# Patient Record
Sex: MALE | Race: WHITE | NOT HISPANIC OR LATINO | Employment: FULL TIME | URBAN - NONMETROPOLITAN AREA
[De-identification: names, ages, dates, MRNs, and addresses within clinical notes are randomized per-mention and may not be internally consistent; named-entity substitution may affect disease eponyms.]

---

## 2017-11-14 ENCOUNTER — APPOINTMENT (OUTPATIENT)
Dept: LAB | Facility: HOSPITAL | Age: 57
End: 2017-11-14

## 2017-11-14 ENCOUNTER — TRANSCRIBE ORDERS (OUTPATIENT)
Dept: ADMINISTRATIVE | Facility: HOSPITAL | Age: 57
End: 2017-11-14

## 2017-11-14 ENCOUNTER — HOSPITAL ENCOUNTER (OUTPATIENT)
Dept: GENERAL RADIOLOGY | Facility: HOSPITAL | Age: 57
Discharge: HOME | End: 2017-11-14
Admitting: UROLOGY

## 2017-11-14 DIAGNOSIS — N13.8 ENLARGED PROSTATE WITH URINARY OBSTRUCTION: Primary | ICD-10-CM

## 2017-11-14 DIAGNOSIS — N40.1 ENLARGED PROSTATE WITH URINARY OBSTRUCTION: Primary | ICD-10-CM

## 2017-11-14 LAB
ALBUMIN SERPL-MCNC: 4.5 G/DL (ref 3.5–5)
ALBUMIN/GLOB SERPL: 1.5 G/DL (ref 1–2)
ALP SERPL-CCNC: 94 U/L (ref 38–126)
ALT SERPL W P-5'-P-CCNC: 37 U/L (ref 13–69)
ANION GAP SERPL CALCULATED.3IONS-SCNC: 17.8 MMOL/L
AST SERPL-CCNC: 21 U/L (ref 15–46)
BILIRUB SERPL-MCNC: 1 MG/DL (ref 0.2–1.3)
BUN BLD-MCNC: 21 MG/DL (ref 7–20)
BUN/CREAT SERPL: 17.5 (ref 6.3–21.9)
CALCIUM SPEC-SCNC: 9.8 MG/DL (ref 8.4–10.2)
CHLORIDE SERPL-SCNC: 106 MMOL/L (ref 98–107)
CO2 SERPL-SCNC: 24 MMOL/L (ref 26–30)
CREAT BLD-MCNC: 1.2 MG/DL (ref 0.6–1.3)
GFR SERPL CREATININE-BSD FRML MDRD: 62 ML/MIN/1.73
GLOBULIN UR ELPH-MCNC: 3 GM/DL
GLUCOSE BLD-MCNC: 115 MG/DL (ref 74–98)
POTASSIUM BLD-SCNC: 4.8 MMOL/L (ref 3.5–5.1)
PROT SERPL-MCNC: 7.5 G/DL (ref 6.3–8.2)
PSA SERPL-MCNC: 1.32 NG/ML (ref 0.06–4)
SODIUM BLD-SCNC: 143 MMOL/L (ref 137–145)

## 2017-11-14 PROCEDURE — 36415 COLL VENOUS BLD VENIPUNCTURE: CPT | Performed by: UROLOGY

## 2017-11-14 PROCEDURE — 80053 COMPREHEN METABOLIC PANEL: CPT | Performed by: UROLOGY

## 2017-11-14 PROCEDURE — 84153 ASSAY OF PSA TOTAL: CPT | Performed by: UROLOGY

## 2017-11-14 PROCEDURE — 74000 HC ABDOMEN KUB: CPT

## 2021-07-30 ENCOUNTER — OFFICE VISIT (OUTPATIENT)
Dept: CARDIOLOGY | Facility: CLINIC | Age: 61
End: 2021-07-30

## 2021-07-30 VITALS
SYSTOLIC BLOOD PRESSURE: 152 MMHG | HEART RATE: 89 BPM | WEIGHT: 293 LBS | DIASTOLIC BLOOD PRESSURE: 100 MMHG | BODY MASS INDEX: 41.95 KG/M2 | HEIGHT: 70 IN | OXYGEN SATURATION: 97 %

## 2021-07-30 DIAGNOSIS — I10 ESSENTIAL HYPERTENSION: ICD-10-CM

## 2021-07-30 DIAGNOSIS — E78.5 DYSLIPIDEMIA: ICD-10-CM

## 2021-07-30 DIAGNOSIS — E66.01 MORBID OBESITY (HCC): ICD-10-CM

## 2021-07-30 DIAGNOSIS — Z72.0 TOBACCO USE: ICD-10-CM

## 2021-07-30 DIAGNOSIS — R07.2 PRECORDIAL PAIN: Primary | ICD-10-CM

## 2021-07-30 PROCEDURE — 99243 OFF/OP CNSLTJ NEW/EST LOW 30: CPT | Performed by: INTERNAL MEDICINE

## 2021-07-30 RX ORDER — LOSARTAN POTASSIUM 25 MG/1
25 TABLET ORAL DAILY
COMMUNITY
Start: 2021-07-07

## 2021-07-30 RX ORDER — CITALOPRAM 40 MG/1
40 TABLET ORAL DAILY
COMMUNITY
Start: 2021-07-07

## 2021-07-30 RX ORDER — ATORVASTATIN CALCIUM 40 MG/1
40 TABLET, FILM COATED ORAL DAILY
COMMUNITY
Start: 2021-07-07

## 2021-07-30 RX ORDER — LEVOTHYROXINE SODIUM 88 UG/1
88 TABLET ORAL DAILY
COMMUNITY
Start: 2021-07-07

## 2021-07-30 RX ORDER — METFORMIN HYDROCHLORIDE 500 MG/1
TABLET, EXTENDED RELEASE ORAL
COMMUNITY
Start: 2021-07-07

## 2021-07-30 RX ORDER — BACLOFEN 20 MG/1
20 TABLET ORAL 2 TIMES DAILY PRN
COMMUNITY
Start: 2021-07-07

## 2021-07-30 RX ORDER — HYDROCODONE BITARTRATE AND ACETAMINOPHEN 10; 325 MG/1; MG/1
1 TABLET ORAL EVERY 8 HOURS PRN
COMMUNITY
Start: 2021-07-07 | End: 2021-09-13

## 2021-07-30 NOTE — PROGRESS NOTES
"    Subjective:     Encounter Date:07/30/2021      Patient ID: Matthew Gibbs is a 60 y.o. male.    Chief Complaint: Chest pain  HPI  This is a 60-year-old male patient who reports a 10-year history of intermittent chest discomfort.  The patient describes a sharp anterior precordial discomfort that occasionally radiates into the inner aspect of his left arm.  The discomfort occurs anywhere from once a month to once every 2 to 3 months.  The discomfort typically has a 5/10 intensity and will last anywhere from 1 minute to 30 minutes.  There is no associated symptoms.  He cannot identify any precipitating aggravating or alleviating features.  There is no exertional or pleuritic component.  He has no shortness of breath at rest or with activity.  He has a history of hypertension type 2 diabetes mellitus and dyslipidemia.  He does not smoke but \"chews tobacco on occasion\".  He has no prior history of myocardial infarction or coronary revascularization.  He reports having a stress test over 30 years ago which was negative.  He denies shortness of breath at rest or with activity.  There is no orthopnea PND or lower extremity edema.  He has no dizziness palpitations or syncope.  The patient indicates that he is unable to do treadmill exercise stress testing due to severe back pain.  He has had multiple prior back surgeries and is on narcotic therapy daily.  He has now apparently developed spinal stenosis due to scar tissue formation and has had progressive weakness in both lower extremities.  The following portions of the patient's history were reviewed and updated as appropriate: allergies, current medications, past family history, past medical history, past social history, past surgical history and problem  Review of Systems   Constitutional: Negative for chills, diaphoresis, fever, malaise/fatigue, weight gain and weight loss.   HENT: Negative for ear discharge, hearing loss, hoarse voice and nosebleeds.    Eyes: Negative " for discharge, double vision, pain and photophobia.   Cardiovascular: Positive for chest pain. Negative for claudication, cyanosis, dyspnea on exertion, irregular heartbeat, leg swelling, near-syncope, orthopnea, palpitations, paroxysmal nocturnal dyspnea and syncope.   Respiratory: Negative for cough, hemoptysis, shortness of breath, sputum production and wheezing.    Endocrine: Negative for cold intolerance, heat intolerance, polydipsia, polyphagia and polyuria.   Hematologic/Lymphatic: Negative for adenopathy and bleeding problem. Does not bruise/bleed easily.   Skin: Negative for color change, flushing, itching and rash.   Musculoskeletal: Negative for muscle cramps, muscle weakness, myalgias and stiffness.   Gastrointestinal: Negative for abdominal pain, diarrhea, hematemesis, hematochezia, nausea and vomiting.   Genitourinary: Negative for dysuria, frequency and nocturia.   Neurological: Negative for focal weakness, loss of balance, numbness, paresthesias and seizures.   Psychiatric/Behavioral: Negative for altered mental status, hallucinations and suicidal ideas.   Allergic/Immunologic: Negative for HIV exposure, hives and persistent infections.           Current Outpatient Medications:   •  atorvastatin (LIPITOR) 40 MG tablet, Take 40 mg by mouth Daily., Disp: , Rfl:   •  baclofen (LIORESAL) 20 MG tablet, Take 20 mg by mouth 2 (Two) Times a Day As Needed., Disp: , Rfl:   •  citalopram (CeleXA) 40 MG tablet, Take 40 mg by mouth Daily., Disp: , Rfl:   •  HYDROcodone-acetaminophen (NORCO)  MG per tablet, Take 1 tablet by mouth Every 8 (Eight) Hours As Needed. for pain, Disp: , Rfl:   •  levothyroxine (SYNTHROID, LEVOTHROID) 88 MCG tablet, Take 88 mcg by mouth Daily., Disp: , Rfl:   •  losartan (COZAAR) 25 MG tablet, Take 25 mg by mouth Daily. for blood pressure., Disp: , Rfl:   •  metFORMIN ER (GLUCOPHAGE-XR) 500 MG 24 hr tablet, TAKE 1 TABLET BY MOUTH EVERY DAY FOR SUGAR, Disp: , Rfl:     Objective:  "  Vitals and nursing note reviewed.   Constitutional:       Appearance: Healthy appearance. Not in distress.   Neck:      Vascular: No JVR. JVD normal.   Pulmonary:      Effort: Pulmonary effort is normal.      Breath sounds: Normal breath sounds. No wheezing. No rhonchi. No rales.   Chest:      Chest wall: Not tender to palpatation.   Cardiovascular:      PMI at left midclavicular line. Normal rate. Regular rhythm. Normal S1. Normal S2.      Murmurs: There is no murmur.      No gallop. No click. No rub.   Pulses:     Intact distal pulses.   Edema:     Peripheral edema absent.   Abdominal:      General: Bowel sounds are normal.      Palpations: Abdomen is soft.      Tenderness: There is no abdominal tenderness.   Musculoskeletal: Normal range of motion.         General: No tenderness. Skin:     General: Skin is warm and dry.   Neurological:      General: No focal deficit present.      Mental Status: Alert and oriented to person, place and time.       Blood pressure 152/100, pulse 89, height 177.8 cm (70\"), weight 133 kg (293 lb), SpO2 97 %.   Lab Review:     Assessment:       1. Precordial pain  Atypical chest pain.  Multiple risk factors for coronary artery disease.  No ischemic evaluation in the last 30 years.  The patient is unable to do treadmill exercise stress testing due to back pain and spinal stenosis resulting in bilateral leg weakness.    2. Essential hypertension  The patient feels that his blood pressure is elevated today due to ongoing back pain.  He indicates that his home blood pressures are much better controlled when he is not in as much discomfort.    3. Dyslipidemia  This is followed by his primary care provider.    4. Morbid obesity (CMS/[facility])  Body mass index is greater than 42.  The obesity pattern is central.  There is evidence of comorbidities.  This is due to excess calorie intake.    5. Tobacco use  Smokeless tobacco use.    Procedures    Plan:     The patient has been counseled regarding " the need to eliminate all forms of tobacco use.  I have recommended a vasodilator nuclear stress test utilizing a 2-day imaging protocol with supine and prone stress images in order to help mitigate for attenuation artifact which is anticipated given his body habitus.  Further recommendations will be predicated on the results of his outpatient testing.

## 2021-08-12 ENCOUNTER — HOSPITAL ENCOUNTER (OUTPATIENT)
Dept: NUCLEAR MEDICINE | Facility: HOSPITAL | Age: 61
Discharge: HOME | End: 2021-08-12

## 2021-08-12 PROCEDURE — 93017 CV STRESS TEST TRACING ONLY: CPT

## 2021-08-12 PROCEDURE — 78452 HT MUSCLE IMAGE SPECT MULT: CPT

## 2021-08-12 PROCEDURE — 25010000002 REGADENOSON 0.4 MG/5ML SOLUTION: Performed by: INTERNAL MEDICINE

## 2021-08-12 PROCEDURE — 93018 CV STRESS TEST I&R ONLY: CPT | Performed by: INTERNAL MEDICINE

## 2021-08-12 PROCEDURE — 0 TECHNETIUM SESTAMIBI: Performed by: INTERNAL MEDICINE

## 2021-08-12 PROCEDURE — A9500 TC99M SESTAMIBI: HCPCS | Performed by: INTERNAL MEDICINE

## 2021-08-12 PROCEDURE — 78452 HT MUSCLE IMAGE SPECT MULT: CPT | Performed by: INTERNAL MEDICINE

## 2021-08-12 RX ADMIN — TECHNETIUM TC 99M SESTAMIBI 1 DOSE: 1 INJECTION INTRAVENOUS at 08:52

## 2021-08-12 RX ADMIN — REGADENOSON 0.4 MG: 0.08 INJECTION, SOLUTION INTRAVENOUS at 08:52

## 2021-08-13 ENCOUNTER — HOSPITAL ENCOUNTER (OUTPATIENT)
Dept: NUCLEAR MEDICINE | Facility: HOSPITAL | Age: 61
Discharge: HOME | End: 2021-08-13

## 2021-08-13 PROCEDURE — A9500 TC99M SESTAMIBI: HCPCS | Performed by: INTERNAL MEDICINE

## 2021-08-13 PROCEDURE — 0 TECHNETIUM SESTAMIBI: Performed by: INTERNAL MEDICINE

## 2021-08-13 RX ADMIN — TECHNETIUM TC 99M SESTAMIBI 1 DOSE: 1 INJECTION INTRAVENOUS at 08:50

## 2021-08-16 LAB
BH CV REST NUCLEAR ISOTOPE DOSE: 31.2 MCI
BH CV STRESS COMMENTS STAGE 1: NORMAL
BH CV STRESS DOSE REGADENOSON STAGE 1: 0.4
BH CV STRESS DURATION MIN STAGE 1: 0
BH CV STRESS DURATION SEC STAGE 1: 10
BH CV STRESS NUCLEAR ISOTOPE DOSE: 32.8 MCI
BH CV STRESS PROTOCOL 1: NORMAL
BH CV STRESS RECOVERY BP: NORMAL MMHG
BH CV STRESS RECOVERY HR: 85 BPM
BH CV STRESS STAGE 1: 1
LV EF NUC BP: 65 %
MAXIMAL PREDICTED HEART RATE: 160 BPM
PERCENT MAX PREDICTED HR: 56.88 %
STRESS BASELINE BP: NORMAL MMHG
STRESS BASELINE HR: 78 BPM
STRESS PERCENT HR: 67 %
STRESS POST PEAK BP: NORMAL MMHG
STRESS POST PEAK HR: 91 BPM
STRESS TARGET HR: 136 BPM

## 2021-08-20 ENCOUNTER — TRANSCRIBE ORDERS (OUTPATIENT)
Dept: ADMINISTRATIVE | Facility: HOSPITAL | Age: 61
End: 2021-08-20

## 2021-08-20 DIAGNOSIS — M54.50 LOW BACK PAIN, UNSPECIFIED BACK PAIN LATERALITY, UNSPECIFIED CHRONICITY, UNSPECIFIED WHETHER SCIATICA PRESENT: Primary | ICD-10-CM

## 2021-08-20 DIAGNOSIS — M54.16 LUMBAR RADICULOPATHY: ICD-10-CM

## 2021-09-13 ENCOUNTER — OFFICE VISIT (OUTPATIENT)
Dept: CARDIOLOGY | Facility: CLINIC | Age: 61
End: 2021-09-13

## 2021-09-13 VITALS
DIASTOLIC BLOOD PRESSURE: 80 MMHG | HEIGHT: 70 IN | BODY MASS INDEX: 42.52 KG/M2 | SYSTOLIC BLOOD PRESSURE: 148 MMHG | OXYGEN SATURATION: 95 % | HEART RATE: 91 BPM | WEIGHT: 297 LBS

## 2021-09-13 DIAGNOSIS — E78.5 DYSLIPIDEMIA: Primary | ICD-10-CM

## 2021-09-13 DIAGNOSIS — Z01.810 PRE-OPERATIVE CARDIOVASCULAR EXAMINATION: ICD-10-CM

## 2021-09-13 DIAGNOSIS — I10 ESSENTIAL HYPERTENSION: ICD-10-CM

## 2021-09-13 DIAGNOSIS — R07.2 PRECORDIAL PAIN: ICD-10-CM

## 2021-09-13 PROCEDURE — 99213 OFFICE O/P EST LOW 20 MIN: CPT | Performed by: INTERNAL MEDICINE

## 2021-09-13 RX ORDER — OXYCODONE AND ACETAMINOPHEN 7.5; 325 MG/1; MG/1
1 TABLET ORAL EVERY 8 HOURS
COMMUNITY
Start: 2021-09-10

## 2021-09-13 RX ORDER — PREGABALIN 75 MG/1
75 CAPSULE ORAL 2 TIMES DAILY
COMMUNITY
Start: 2021-09-09

## 2021-09-13 NOTE — PROGRESS NOTES
Subjective:     Encounter Date:09/13/2021      Patient ID: Matthew Gibbs is a 60 y.o. male.    Chief Complaint: Chest pain  HPI  This is a 60-year-old male patient who underwent an outpatient nuclear stress test to evaluate atypical chest pain.  Vasodilator nuclear stress testing showed no evidence of ischemia or infarction with a normal calculated ejection fraction.  The patient indicates his chest discomfort has spontaneously resolved.  He indicates that his primary issue is low back pain.  He is planning to have an MRI of his spine later this week and possible repeat back surgery.  He remains a non-smoker.  The following portions of the patient's history were reviewed and updated as appropriate: allergies, current medications, past family history, past medical history, past social history, past surgical history and problem  Review of Systems   Constitutional: Negative for chills, diaphoresis, fever, malaise/fatigue, weight gain and weight loss.   HENT: Negative for ear discharge, hearing loss, hoarse voice and nosebleeds.    Eyes: Negative for discharge, double vision, pain and photophobia.   Cardiovascular: Negative for chest pain, claudication, cyanosis, dyspnea on exertion, irregular heartbeat, leg swelling, near-syncope, orthopnea, palpitations, paroxysmal nocturnal dyspnea and syncope.   Respiratory: Negative for cough, hemoptysis, shortness of breath, sputum production and wheezing.    Endocrine: Negative for cold intolerance, heat intolerance, polydipsia, polyphagia and polyuria.   Hematologic/Lymphatic: Negative for adenopathy and bleeding problem. Does not bruise/bleed easily.   Skin: Negative for color change, flushing, itching and rash.   Musculoskeletal: Negative for muscle cramps, muscle weakness, myalgias and stiffness.   Gastrointestinal: Negative for abdominal pain, diarrhea, hematemesis, hematochezia, nausea and vomiting.   Genitourinary: Negative for dysuria, frequency and nocturia.    Neurological: Negative for focal weakness, loss of balance, numbness, paresthesias and seizures.   Psychiatric/Behavioral: Negative for altered mental status, hallucinations and suicidal ideas.   Allergic/Immunologic: Negative for HIV exposure, hives and persistent infections.           Current Outpatient Medications:   •  atorvastatin (LIPITOR) 40 MG tablet, Take 40 mg by mouth Daily., Disp: , Rfl:   •  baclofen (LIORESAL) 20 MG tablet, Take 20 mg by mouth 2 (Two) Times a Day As Needed., Disp: , Rfl:   •  citalopram (CeleXA) 40 MG tablet, Take 40 mg by mouth Daily., Disp: , Rfl:   •  levothyroxine (SYNTHROID, LEVOTHROID) 88 MCG tablet, Take 88 mcg by mouth Daily., Disp: , Rfl:   •  losartan (COZAAR) 25 MG tablet, Take 25 mg by mouth Daily. for blood pressure., Disp: , Rfl:   •  metFORMIN ER (GLUCOPHAGE-XR) 500 MG 24 hr tablet, TAKE 1 TABLET BY MOUTH EVERY DAY FOR SUGAR, Disp: , Rfl:   •  oxyCODONE-acetaminophen (PERCOCET) 7.5-325 MG per tablet, Take 1 tablet by mouth Every 8 (Eight) Hours., Disp: , Rfl:   •  pregabalin (LYRICA) 75 MG capsule, Take 75 mg by mouth 2 (Two) Times a Day., Disp: , Rfl:     Objective:   Vitals and nursing note reviewed.   Constitutional:       Appearance: Healthy appearance. Not in distress.   Neck:      Vascular: No JVR. JVD normal.   Pulmonary:      Effort: Pulmonary effort is normal.      Breath sounds: Normal breath sounds. No wheezing. No rhonchi. No rales.   Chest:      Chest wall: Not tender to palpatation.   Cardiovascular:      PMI at left midclavicular line. Normal rate. Regular rhythm. Normal S1. Normal S2.      Murmurs: There is no murmur.      No gallop. No click. No rub.   Pulses:     Intact distal pulses.   Edema:     Peripheral edema absent.   Abdominal:      General: Bowel sounds are normal.      Palpations: Abdomen is soft.      Tenderness: There is no abdominal tenderness.   Musculoskeletal: Normal range of motion.         General: No tenderness. Skin:     General:  "Skin is warm and dry.   Neurological:      General: No focal deficit present.      Mental Status: Alert and oriented to person, place and time.       Blood pressure 148/80, pulse 91, height 177.8 cm (70\"), weight 135 kg (297 lb), SpO2 95 %.   Lab Review:     Assessment:       1. Dyslipidemia  Managed per primary care provider.    2. Essential hypertension  Less than ideal blood pressure control.  It appears his blood pressure is being driven upward by chronic pain.  He is no longer taking Lortabs.  Management is deferred to his primary care provider.    3. Precordial pain  Noncardiac chest pain.  Spontaneous resolution of symptoms.  No evidence of ischemic heart disease.    4. Pre-operative cardiovascular examination  The patient is considered acceptable risk of a cardiovascular complication if he proceeds with back surgery.    Procedures    Plan:     If the patient's blood pressure remains elevated, I would focus on better pain control measures.  If this does not improve blood pressure control I would recommend up titration of his losartan with the possible addition of thiazide type diuretics.  The importance of diet exercise and weight management has been reinforced to the patient.  The patient has been counseled regarding the importance of dietary sodium restriction.  The patient may proceed with back surgery if deemed necessary at an acceptable risk of cardiovascular complication without further delay, additional testing or change in medications.      "

## 2021-09-17 ENCOUNTER — HOSPITAL ENCOUNTER (OUTPATIENT)
Dept: MRI IMAGING | Facility: HOSPITAL | Age: 61
Discharge: HOME | End: 2021-09-17
Admitting: PHYSICIAN ASSISTANT

## 2021-09-17 DIAGNOSIS — M54.16 LUMBAR RADICULOPATHY: ICD-10-CM

## 2021-09-17 DIAGNOSIS — M54.50 LOW BACK PAIN, UNSPECIFIED BACK PAIN LATERALITY, UNSPECIFIED CHRONICITY, UNSPECIFIED WHETHER SCIATICA PRESENT: ICD-10-CM

## 2021-09-17 PROCEDURE — 0 GADOBENATE DIMEGLUMINE 529 MG/ML SOLUTION: Performed by: PHYSICIAN ASSISTANT

## 2021-09-17 PROCEDURE — 72158 MRI LUMBAR SPINE W/O & W/DYE: CPT

## 2021-09-17 PROCEDURE — A9577 INJ MULTIHANCE: HCPCS | Performed by: PHYSICIAN ASSISTANT

## 2021-09-17 RX ADMIN — GADOBENATE DIMEGLUMINE 12 ML: 529 INJECTION, SOLUTION INTRAVENOUS at 19:18

## 2021-09-17 RX ADMIN — GADOBENATE DIMEGLUMINE 15 ML: 529 INJECTION, SOLUTION INTRAVENOUS at 19:18

## 2023-07-28 ENCOUNTER — APPOINTMENT (OUTPATIENT)
Dept: CT IMAGING | Facility: HOSPITAL | Age: 63
End: 2023-07-28
Payer: COMMERCIAL

## 2023-07-28 ENCOUNTER — APPOINTMENT (OUTPATIENT)
Dept: GENERAL RADIOLOGY | Facility: HOSPITAL | Age: 63
End: 2023-07-28
Payer: COMMERCIAL

## 2023-07-28 ENCOUNTER — HOSPITAL ENCOUNTER (EMERGENCY)
Facility: HOSPITAL | Age: 63
Discharge: HOME | End: 2023-07-28
Attending: STUDENT IN AN ORGANIZED HEALTH CARE EDUCATION/TRAINING PROGRAM
Payer: COMMERCIAL

## 2023-07-28 VITALS
SYSTOLIC BLOOD PRESSURE: 149 MMHG | BODY MASS INDEX: 40.37 KG/M2 | DIASTOLIC BLOOD PRESSURE: 72 MMHG | HEIGHT: 70 IN | WEIGHT: 282 LBS | HEART RATE: 69 BPM | OXYGEN SATURATION: 97 % | TEMPERATURE: 98.1 F | RESPIRATION RATE: 20 BRPM

## 2023-07-28 DIAGNOSIS — R06.00 DYSPNEA, UNSPECIFIED TYPE: ICD-10-CM

## 2023-07-28 DIAGNOSIS — R07.9 CHEST PAIN, UNSPECIFIED TYPE: Primary | ICD-10-CM

## 2023-07-28 LAB
ALBUMIN SERPL-MCNC: 4.3 G/DL (ref 3.5–5.2)
ALBUMIN/GLOB SERPL: 1.2 G/DL
ALP SERPL-CCNC: 99 U/L (ref 39–117)
ALT SERPL W P-5'-P-CCNC: 16 U/L (ref 1–41)
ANION GAP SERPL CALCULATED.3IONS-SCNC: 12.9 MMOL/L (ref 5–15)
AST SERPL-CCNC: 14 U/L (ref 1–40)
BASOPHILS # BLD AUTO: 0.06 10*3/MM3 (ref 0–0.2)
BASOPHILS NFR BLD AUTO: 0.4 % (ref 0–1.5)
BILIRUB SERPL-MCNC: 1.2 MG/DL (ref 0–1.2)
BUN SERPL-MCNC: 19 MG/DL (ref 8–23)
BUN/CREAT SERPL: 17.6 (ref 7–25)
CALCIUM SPEC-SCNC: 10.1 MG/DL (ref 8.6–10.5)
CHLORIDE SERPL-SCNC: 101 MMOL/L (ref 98–107)
CO2 SERPL-SCNC: 23.1 MMOL/L (ref 22–29)
CREAT SERPL-MCNC: 1.08 MG/DL (ref 0.76–1.27)
DEPRECATED RDW RBC AUTO: 43.5 FL (ref 37–54)
EGFRCR SERPLBLD CKD-EPI 2021: 77.6 ML/MIN/1.73
EOSINOPHIL # BLD AUTO: 0.2 10*3/MM3 (ref 0–0.4)
EOSINOPHIL NFR BLD AUTO: 1.4 % (ref 0.3–6.2)
ERYTHROCYTE [DISTWIDTH] IN BLOOD BY AUTOMATED COUNT: 14.3 % (ref 12.3–15.4)
FLUAV RNA RESP QL NAA+PROBE: NOT DETECTED
FLUBV RNA RESP QL NAA+PROBE: NOT DETECTED
GEN 5 2HR TROPONIN T REFLEX: 10 NG/L
GLOBULIN UR ELPH-MCNC: 3.5 GM/DL
GLUCOSE SERPL-MCNC: 147 MG/DL (ref 65–99)
HCT VFR BLD AUTO: 43.2 % (ref 37.5–51)
HGB BLD-MCNC: 14.5 G/DL (ref 13–17.7)
HOLD SPECIMEN: NORMAL
HOLD SPECIMEN: NORMAL
IMM GRANULOCYTES # BLD AUTO: 0.07 10*3/MM3 (ref 0–0.05)
IMM GRANULOCYTES NFR BLD AUTO: 0.5 % (ref 0–0.5)
LYMPHOCYTES # BLD AUTO: 1.92 10*3/MM3 (ref 0.7–3.1)
LYMPHOCYTES NFR BLD AUTO: 13.4 % (ref 19.6–45.3)
MCH RBC QN AUTO: 28.2 PG (ref 26.6–33)
MCHC RBC AUTO-ENTMCNC: 33.6 G/DL (ref 31.5–35.7)
MCV RBC AUTO: 84 FL (ref 79–97)
MONOCYTES # BLD AUTO: 1.42 10*3/MM3 (ref 0.1–0.9)
MONOCYTES NFR BLD AUTO: 9.9 % (ref 5–12)
NEUTROPHILS NFR BLD AUTO: 10.62 10*3/MM3 (ref 1.7–7)
NEUTROPHILS NFR BLD AUTO: 74.4 % (ref 42.7–76)
NRBC BLD AUTO-RTO: 0 /100 WBC (ref 0–0.2)
NT-PROBNP SERPL-MCNC: 361.7 PG/ML (ref 0–900)
PLATELET # BLD AUTO: 244 10*3/MM3 (ref 140–450)
PMV BLD AUTO: 8.3 FL (ref 6–12)
POTASSIUM SERPL-SCNC: 4.7 MMOL/L (ref 3.5–5.2)
PROCALCITONIN SERPL-MCNC: 0.06 NG/ML (ref 0–0.25)
PROT SERPL-MCNC: 7.8 G/DL (ref 6–8.5)
RBC # BLD AUTO: 5.14 10*6/MM3 (ref 4.14–5.8)
SARS-COV-2 RNA RESP QL NAA+PROBE: NOT DETECTED
SODIUM SERPL-SCNC: 137 MMOL/L (ref 136–145)
TROPONIN T DELTA: 0 NG/L
TROPONIN T SERPL HS-MCNC: 10 NG/L
WBC NRBC COR # BLD: 14.29 10*3/MM3 (ref 3.4–10.8)
WHOLE BLOOD HOLD COAG: NORMAL
WHOLE BLOOD HOLD SPECIMEN: NORMAL

## 2023-07-28 PROCEDURE — 71275 CT ANGIOGRAPHY CHEST: CPT

## 2023-07-28 PROCEDURE — 83880 ASSAY OF NATRIURETIC PEPTIDE: CPT | Performed by: PHYSICIAN ASSISTANT

## 2023-07-28 PROCEDURE — 93005 ELECTROCARDIOGRAM TRACING: CPT | Performed by: STUDENT IN AN ORGANIZED HEALTH CARE EDUCATION/TRAINING PROGRAM

## 2023-07-28 PROCEDURE — 84145 PROCALCITONIN (PCT): CPT | Performed by: PHYSICIAN ASSISTANT

## 2023-07-28 PROCEDURE — 87636 SARSCOV2 & INF A&B AMP PRB: CPT | Performed by: PHYSICIAN ASSISTANT

## 2023-07-28 PROCEDURE — 99285 EMERGENCY DEPT VISIT HI MDM: CPT

## 2023-07-28 PROCEDURE — 25510000001 IOPAMIDOL 61 % SOLUTION: Performed by: STUDENT IN AN ORGANIZED HEALTH CARE EDUCATION/TRAINING PROGRAM

## 2023-07-28 PROCEDURE — 71045 X-RAY EXAM CHEST 1 VIEW: CPT

## 2023-07-28 PROCEDURE — 84484 ASSAY OF TROPONIN QUANT: CPT | Performed by: STUDENT IN AN ORGANIZED HEALTH CARE EDUCATION/TRAINING PROGRAM

## 2023-07-28 PROCEDURE — 36415 COLL VENOUS BLD VENIPUNCTURE: CPT

## 2023-07-28 PROCEDURE — 80053 COMPREHEN METABOLIC PANEL: CPT | Performed by: STUDENT IN AN ORGANIZED HEALTH CARE EDUCATION/TRAINING PROGRAM

## 2023-07-28 PROCEDURE — 85025 COMPLETE CBC W/AUTO DIFF WBC: CPT | Performed by: STUDENT IN AN ORGANIZED HEALTH CARE EDUCATION/TRAINING PROGRAM

## 2023-07-28 RX ORDER — ASPIRIN 325 MG
325 TABLET ORAL ONCE
Status: DISCONTINUED | OUTPATIENT
Start: 2023-07-28 | End: 2023-07-28 | Stop reason: HOSPADM

## 2023-07-28 RX ORDER — SODIUM CHLORIDE 0.9 % (FLUSH) 0.9 %
10 SYRINGE (ML) INJECTION AS NEEDED
Status: DISCONTINUED | OUTPATIENT
Start: 2023-07-28 | End: 2023-07-28 | Stop reason: HOSPADM

## 2023-07-28 RX ORDER — ALBUTEROL SULFATE 90 UG/1
2 AEROSOL, METERED RESPIRATORY (INHALATION) ONCE
Status: COMPLETED | OUTPATIENT
Start: 2023-07-28 | End: 2023-07-28

## 2023-07-28 RX ADMIN — ALBUTEROL SULFATE 2 PUFF: 90 AEROSOL, METERED RESPIRATORY (INHALATION) at 14:14

## 2023-07-28 RX ADMIN — IOPAMIDOL 100 ML: 612 INJECTION, SOLUTION INTRAVENOUS at 14:09

## 2023-07-28 NOTE — ED PROVIDER NOTES
Subjective:  Chief Complaint:  Chest pain    History of Present Illness:  Patient is a 62-year-old male with history of depression, diabetes, hyperlipidemia, hypertension, kidney stones, thyroid disease, COVID 6 weeks ago presenting to the ER with complaints of chest pain and shortness of breath that has worsened over the last few days.  Patient states that shortness of breath is mainly with activity.  He also states the chest pain is sharp radiating to his back but has improved some since Toradol given at his PCPs office.  He states they did an EKG there and thought it was good but was concerned about a blood clot and afraid that he needed further evaluation so sent him to the ER.  Patient states he did have a very hard time with COVID this last time.  He states that he has been vaccinated and received boosters and the first time he had COVID was not severe but states he has had difficult time recovering from the most recent COVID infection.  He is not on anticoagulants and denies any history of blood clots.  Denies additional symptoms or complaints at this time.      Nurses Notes reviewed and agree, including vitals, allergies, social history and prior medical history.     REVIEW OF SYSTEMS: All systems reviewed and not pertinent unless noted.  Review of Systems   Respiratory:  Positive for shortness of breath.    Cardiovascular:  Positive for chest pain.   All other systems reviewed and are negative.    Past Medical History:   Diagnosis Date    Depression     Diabetes     Hyperlipidemia     Hypertension     Kidney stones     Thyroid disease        Allergies:    Gabapentin      Past Surgical History:   Procedure Laterality Date    BACK SURGERY  01/29/2013         Social History     Socioeconomic History    Marital status:    Tobacco Use    Smoking status: Never    Smokeless tobacco: Current   Substance and Sexual Activity    Alcohol use: Yes    Drug use: Never    Sexual activity: Defer         No family  "history on file.    Objective  Physical Exam:  /72 (BP Location: Left arm, Patient Position: Lying)   Pulse 69   Temp 98.1 °F (36.7 °C) (Oral)   Resp 20   Ht 177.8 cm (70\")   Wt 128 kg (282 lb)   SpO2 97%   BMI 40.46 kg/m²      Physical Exam  Vitals and nursing note reviewed.   Constitutional:       General: He is not in acute distress.     Appearance: He is not toxic-appearing.   HENT:      Head: Normocephalic and atraumatic.   Eyes:      Extraocular Movements: Extraocular movements intact.   Cardiovascular:      Rate and Rhythm: Normal rate.      Heart sounds: Normal heart sounds.   Pulmonary:      Effort: Pulmonary effort is normal.      Breath sounds: Normal breath sounds.   Abdominal:      Palpations: Abdomen is soft.      Tenderness: There is no abdominal tenderness.   Musculoskeletal:         General: Normal range of motion.      Cervical back: Normal range of motion and neck supple.   Skin:     General: Skin is warm and dry.   Neurological:      General: No focal deficit present.      Mental Status: He is alert and oriented to person, place, and time.   Psychiatric:         Mood and Affect: Mood normal.         Behavior: Behavior normal.       Procedures    ED Course:    ED Course as of 07/28/23 1531   Fri Jul 28, 2023   1234 EKG interpreted by me, normal sinus rhythm no concerning ST changes noted, rate of 70 [JE]      ED Course User Index  [JE] Idris Roberts MD       Lab Results (last 24 hours)       Procedure Component Value Units Date/Time    CBC & Differential [[id number]]  (Abnormal) Collected: 07/28/23 1228    Specimen: Blood Updated: 07/28/23 1237    Narrative:      The following orders were created for panel order CBC & Differential.  Procedure                               Abnormality         Status                     ---------                               -----------         ------                     CBC Auto Differential[[id number]]        Abnormal            Final result     "             Please view results for these tests on the individual orders.    Comprehensive Metabolic Panel [[id number]]  (Abnormal) Collected: 07/28/23 1228    Specimen: Blood Updated: 07/28/23 1300     Glucose 147 mg/dL      BUN 19 mg/dL      Creatinine 1.08 mg/dL      Sodium 137 mmol/L      Potassium 4.7 mmol/L      Chloride 101 mmol/L      CO2 23.1 mmol/L      Calcium 10.1 mg/dL      Total Protein 7.8 g/dL      Albumin 4.3 g/dL      ALT (SGPT) 16 U/L      AST (SGOT) 14 U/L      Alkaline Phosphatase 99 U/L      Total Bilirubin 1.2 mg/dL      Globulin 3.5 gm/dL      A/G Ratio 1.2 g/dL      BUN/Creatinine Ratio 17.6     Anion Gap 12.9 mmol/L      eGFR 77.6 mL/min/1.73     Narrative:      GFR Normal >60  Chronic Kidney Disease <60  Kidney Failure <15      High Sensitivity Troponin T [[id number]]  (Normal) Collected: 07/28/23 1228    Specimen: Blood Updated: 07/28/23 1300     HS Troponin T 10 ng/L     Narrative:      High Sensitive Troponin T Reference Range:  <10.0 ng/L- Negative Female for AMI  <15.0 ng/L- Negative Male for AMI  >=10 - Abnormal Female indicating possible myocardial injury.  >=15 - Abnormal Male indicating possible myocardial injury.   Clinicians would have to utilize clinical acumen, EKG, Troponin, and serial changes to determine if it is an Acute Myocardial Infarction or myocardial injury due to an underlying chronic condition.         CBC Auto Differential [[id number]]  (Abnormal) Collected: 07/28/23 1228    Specimen: Blood Updated: 07/28/23 1237     WBC 14.29 10*3/mm3      RBC 5.14 10*6/mm3      Hemoglobin 14.5 g/dL      Hematocrit 43.2 %      MCV 84.0 fL      MCH 28.2 pg      MCHC 33.6 g/dL      RDW 14.3 %      RDW-SD 43.5 fl      MPV 8.3 fL      Platelets 244 10*3/mm3      Neutrophil % 74.4 %      Lymphocyte % 13.4 %      Monocyte % 9.9 %      Eosinophil % 1.4 %      Basophil % 0.4 %      Immature Grans % 0.5 %      Neutrophils, Absolute 10.62 10*3/mm3      Lymphocytes, Absolute 1.92 10*3/mm3       "Monocytes, Absolute 1.42 10*3/mm3      Eosinophils, Absolute 0.20 10*3/mm3      Basophils, Absolute 0.06 10*3/mm3      Immature Grans, Absolute 0.07 10*3/mm3      nRBC 0.0 /100 WBC     Procalcitonin [[id number]]  (Normal) Collected: 07/28/23 1228    Specimen: Blood Updated: 07/28/23 1343     Procalcitonin 0.06 ng/mL     Narrative:      As a Marker for Sepsis (Non-Neonates):    1. <0.5 ng/mL represents a low risk of severe sepsis and/or septic shock.  2. >2 ng/mL represents a high risk of severe sepsis and/or septic shock.    As a Marker for Lower Respiratory Tract Infections that require antibiotic therapy:    PCT on Admission    Antibiotic Therapy       6-12 Hrs later    >0.5                Strongly Recommended  >0.25 - <0.5        Recommended   0.1 - 0.25          Discouraged              Remeasure/reassess PCT  <0.1                Strongly Discouraged     Remeasure/reassess PCT    As 28 day mortality risk marker: \"Change in Procalcitonin Result\" (>80% or <=80%) if Day 0 (or Day 1) and Day 4 values are available. Refer to http://www.[email or web address]-pct-calculator.[email or web address]    Change in PCT <=80%  A decrease of PCT levels below or equal to 80% defines a positive change in PCT test result representing a higher risk for 28-day all-cause mortality of patients diagnosed with severe sepsis for septic shock.    Change in PCT >80%  A decrease of PCT levels of more than 80% defines a negative change in PCT result representing a lower risk for 28-day all-cause mortality of patients diagnosed with severe sepsis or septic shock.       BNP [[id number]]  (Normal) Collected: 07/28/23 1228    Specimen: Blood Updated: 07/28/23 1343     proBNP 361.7 pg/mL     Narrative:      Among patients with dyspnea, NT-proBNP is highly sensitive for the detection of acute congestive heart failure. In addition NT-proBNP of <300 pg/ml effectively rules out acute congestive heart failure with 99% negative predictive value.      COVID-19 and FLU A/B PCR - Swab, " Nasopharynx [[id number]]  (Normal) Collected: 07/28/23 1323    Specimen: Swab from Nasopharynx Updated: 07/28/23 1416     COVID19 Not Detected     Influenza A PCR Not Detected     Influenza B PCR Not Detected    Narrative:      Fact sheet for providers: https://www.fda.gov/media/120749/download    Fact sheet for patients: https://www.fda.gov/media/710076/download    Test performed by PCR.    High Sensitivity Troponin T 2Hr [[id number]]  (Normal) Collected: 07/28/23 1416    Specimen: Blood Updated: 07/28/23 1439     HS Troponin T 10 ng/L      Troponin T Delta 0 ng/L     Narrative:      High Sensitive Troponin T Reference Range:  <10.0 ng/L- Negative Female for AMI  <15.0 ng/L- Negative Male for AMI  >=10 - Abnormal Female indicating possible myocardial injury.  >=15 - Abnormal Male indicating possible myocardial injury.   Clinicians would have to utilize clinical acumen, EKG, Troponin, and serial changes to determine if it is an Acute Myocardial Infarction or myocardial injury due to an underlying chronic condition.                  XR Chest 1 View    Result Date: 7/28/2023  CLINICAL INDICATION:  Chest Pain Triage Protocol  EXAMINATION TECHNIQUE: XR CHEST 1 VW-  COMPARISON: None.  FINDINGS: Mild bibasilar ill-defined opacities. No dense focal airspace consolidation. No pneumothorax or large pleural effusion. Heart and mediastinal contours are within normal limits. No acute osseous or soft tissue abnormality. No free air under the hemidiaphragms.      Impression: Mild bibasilar ill-defined opacities, atelectasis or developing airspace disease/pneumonia, correlate clinically.    Images personally reviewed, interpreted and dictated by CHRISTINA Coreas.     This report was signed and finalized on 7/28/2023 1:11 PM by CHRISTINA Coreas.      CT Angiogram Chest    Result Date: 7/28/2023  PROCEDURE: CT ANGIOGRAM CHEST-  HISTORY: chest pain radiating to back, want to rule out PE/dissection  COMPARISON: None .   TECHNIQUE: Multiple axial CT images were obtained from the thoracic inlet through the upper abdomen following the administration of Isovue 300 per the CT PE protocol. Coronal and oblique 3D MIP images were reconstructed from the original axial data set.  FINDINGS: There are no filling defects within the pulmonary arteries to suggest an embolus. The thoracic aorta is normal in caliber with no evidence of dissection. The heart is normal in size. There is coronary artery disease. There are no pleural or pericardial effusions. There is no adenopathy.  Lung windows reveal no focal opacities or suspicious pulmonary nodules. There are hypoventilatory changes. There is bibasilar atelectasis. The visualized upper abdomen shows bilateral renal cysts. Bone windows reveal no acute osseous abnormalities.      Impression: No evidence of pulmonary embolus or dissection.     DLP: CTDI:   This study was performed with techniques to keep radiation doses as low as reasonably achievable (ALARA). Individualized dose reduction techniques using automated exposure control or adjustment of mA and/or kV according to the patient size were employed.     Images were reviewed, interpreted, and dictated by CHRISTINA Zimmerman Transcribed by Romina Holliday PA-C.  This report was signed and finalized on 7/28/2023 2:44 PM by CHRISTINA Coreas.        HEART Score: 3    MDM  Patient was evaluated in the ER for chest pain and shortness of breath that is worsened over the last few days.  He was seen by his PCP and given a Toradol injection and had an EKG performed that was found to be normal.  He is hemodynamically stable, afebrile, nontoxic-appearing on exam.  Patient was evaluated in the ER for chest pain and shortness of breath that is worsened over the last few days.  He was seen by his PCP and given a Toradol injection and had an EKG performed that was found to be normal.  He is hemodynamically stable, afebrile, nontoxic-appearing on  exam.  Oxygen saturation 96% on room air.  Differential diagnosis includes but is not limited to PE, viral illness, pneumonia, among others.  Initial plan includes CBC, CMP, troponins, COVID/flu swab, procalcitonin, BNP, CTA of chest given chest pain radiating to back.    COVID and flu are negative.  Labs remarkable for leukocytosis but otherwise unremarkable with normal BNP, troponins, procalcitonin.  Chest x-ray revealed concern for developing airspace disease.  However, CTA of chest revealed no evidence of PE or dissection and no effusions or opacities per radiology.  Patient has remained stable throughout ER visit.  He is agreeable with plan for discharge.  He was given an albuterol inhaler.  He was advised to follow-up with cardiology, Dr. Maynard, for further outpatient evaluation as he may benefit from echo or stress test.  He did have COVID 6 weeks ago and has had a hard time getting over it.  Symptoms could be related to long COVID.  He was advised follow-up with his PCP for further outpatient evaluation as well.  Precautions were given for return to the ER for any new or worsening symptoms.    Final diagnoses:   Chest pain, unspecified type   Dyspnea, unspecified type          Judi Toribio PA-C  07/28/23 8314

## 2023-07-28 NOTE — DISCHARGE INSTRUCTIONS
Follow-up with your PCP and Dr. Maynard, cardiology, for further outpatient evaluation if symptoms persist.  Return to the ER for new or worsening symptoms or acute concerns.

## 2023-11-21 ENCOUNTER — OFFICE VISIT (OUTPATIENT)
Dept: PULMONOLOGY | Facility: CLINIC | Age: 63
End: 2023-11-21
Payer: COMMERCIAL

## 2023-11-21 ENCOUNTER — OFFICE VISIT (OUTPATIENT)
Dept: PULMONOLOGY | Facility: CLINIC | Age: 63
End: 2023-11-21

## 2023-11-21 VITALS
SYSTOLIC BLOOD PRESSURE: 158 MMHG | OXYGEN SATURATION: 96 % | RESPIRATION RATE: 16 BRPM | BODY MASS INDEX: 41.66 KG/M2 | DIASTOLIC BLOOD PRESSURE: 98 MMHG | WEIGHT: 291 LBS | HEART RATE: 96 BPM | HEIGHT: 70 IN

## 2023-11-21 DIAGNOSIS — R06.02 SHORTNESS OF BREATH: ICD-10-CM

## 2023-11-21 DIAGNOSIS — Z86.16 HISTORY OF COVID-19: ICD-10-CM

## 2023-11-21 DIAGNOSIS — G47.33 OBSTRUCTIVE SLEEP APNEA: ICD-10-CM

## 2023-11-21 DIAGNOSIS — R06.02 SHORTNESS OF BREATH: Primary | ICD-10-CM

## 2023-11-21 DIAGNOSIS — E66.01 MORBID OBESITY, UNSPECIFIED OBESITY TYPE: ICD-10-CM

## 2023-11-21 PROCEDURE — 95012 NITRIC OXIDE EXP GAS DETER: CPT | Performed by: INTERNAL MEDICINE

## 2023-11-21 RX ORDER — BUDESONIDE AND FORMOTEROL FUMARATE DIHYDRATE 160; 4.5 UG/1; UG/1
2 AEROSOL RESPIRATORY (INHALATION)
Qty: 1 EACH | Refills: 5 | Status: SHIPPED | OUTPATIENT
Start: 2023-11-21

## 2023-11-21 RX ORDER — LEVOFLOXACIN 500 MG/1
TABLET, FILM COATED ORAL
COMMUNITY
Start: 2023-11-15

## 2023-11-21 RX ORDER — GLIPIZIDE 10 MG/1
TABLET ORAL
COMMUNITY
Start: 2023-11-13

## 2023-11-21 RX ORDER — PREDNISONE 10 MG/1
TABLET ORAL
COMMUNITY
Start: 2023-11-15

## 2023-11-21 NOTE — PROGRESS NOTES
"  CONSULT NOTE    Requested by:   Lyla Luna APRN Margison, Gloria, APRN      Chief Complaint   Patient presents with    Sleeping Problem    Consult       Subjective:  Matthew Gibbs is a 63 y.o. male.   Patient comes in today for consultation because of shortness of breath.    The patient says that he developed COVID-19 infection in summer 2023 and since then he has had increasing shortness of breath.    The patient says that although initially he was \"not too sick\", he ended up in the ER 2 weeks after initial infection and was on antibiotics, x2 or 3, over the next 1 month or so.    He feels that his breathing never went back to the baseline    The patient has been on CPAP for years.,  For diagnosis of sleep apnea    Although he is able to use a CPAP on a regular basis, he is having issues with mask that leaks    He does feel slight increase in fatigue over the past few months as well.    The following portions of the patient's history were reviewed and updated as appropriate: allergies, current medications, past family history, past medical history, past social history, and past surgical history.    Review of Systems   Constitutional:  Positive for fatigue. Negative for chills (some) and fever.   HENT:  Negative for sinus pressure, sneezing and sore throat.    Respiratory:  Positive for chest tightness. Negative for cough, shortness of breath and wheezing.    Cardiovascular:  Negative for palpitations and leg swelling (some).   Psychiatric/Behavioral:  Positive for sleep disturbance.    All other systems reviewed and are negative.      Past Medical History:   Diagnosis Date    Depression     Diabetes     Hyperlipidemia     Hypertension     Kidney stones     Thyroid disease        Social History     Tobacco Use    Smoking status: Never    Smokeless tobacco: Current   Substance Use Topics    Alcohol use: Yes         Objective:  Visit Vitals  /98   Pulse 96   Resp 16   Ht 177.8 cm (70\") Comment: pt " reported   Wt 132 kg (291 lb)   SpO2 96%   BMI 41.75 kg/m²       Physical Exam  Vitals reviewed.   Constitutional:       Appearance: He is well-developed.   HENT:      Head: Atraumatic.      Mouth/Throat:      Mouth: Mucous membranes are moist.      Comments: Oropharynx was crowded.  Eyes:      Pupils: Pupils are equal, round, and reactive to light.   Neck:      Thyroid: No thyromegaly.      Vascular: No JVD.      Trachea: No tracheal deviation.   Cardiovascular:      Rate and Rhythm: Normal rate and regular rhythm.   Pulmonary:      Effort: Pulmonary effort is normal. No respiratory distress.      Breath sounds: Normal breath sounds. No wheezing.   Musculoskeletal:      Right lower leg: No edema.      Left lower leg: No edema.      Comments: Gait was normal.   Skin:     General: Skin is warm and dry.   Neurological:      Mental Status: He is alert and oriented to person, place, and time.   Psychiatric:         Mood and Affect: Mood normal.         Behavior: Behavior normal.         Assessment/Plan:  Diagnoses and all orders for this visit:    1. Shortness of breath (Primary)  -     Complete PFT - Pre & Post Bronchodilator; Future  -     Nitric Oxide    2. History of COVID-19  -     Complete PFT - Pre & Post Bronchodilator; Future  -     Nitric Oxide    3. Obstructive sleep apnea    4. Morbid obesity, unspecified obesity type    Other orders  -     budesonide-formoterol (Symbicort) 160-4.5 MCG/ACT inhaler; Inhale 2 puffs 2 (Two) Times a Day. Rinse mouth with water after use.  Dispense: 1 each; Refill: 5        Return in about 8 weeks (around 1/18/2024) for Recheck, Labs, For [place] ([place]), ....Also 6 mths w/ Dr. Baugh.    DISCUSSION(if any):  Last chest x-ray was reviewed personally and the results were shared with the patient.  Images reviewed personally.   Results for orders placed during the hospital encounter of 07/28/23    XR Chest 1 View    Narrative  CLINICAL INDICATION:  Chest Pain Triage  Protocol    EXAMINATION TECHNIQUE:  XR CHEST 1 VW-    COMPARISON:  None.    FINDINGS:  Mild bibasilar ill-defined opacities. No dense focal airspace  consolidation. No pneumothorax or large pleural effusion. Heart and  mediastinal contours are within normal limits. No acute osseous or soft  tissue abnormality. No free air under the hemidiaphragms.    Impression  Mild bibasilar ill-defined opacities, atelectasis or developing airspace  disease/pneumonia, correlate clinically.        Images personally reviewed, interpreted and dictated by CHRISTINA Coreas.          This report was signed and finalized on 7/28/2023 1:11 PM by CHRISTINA Coreas.      Last CT scan results was reviewed in great detail with the patient.  Results for orders placed during the hospital encounter of 07/28/23    CT Angiogram Chest    Narrative  PROCEDURE: CT ANGIOGRAM CHEST-    HISTORY: chest pain radiating to back, want to rule out PE/dissection    COMPARISON: None .    TECHNIQUE: Multiple axial CT images were obtained from the thoracic  inlet through the upper abdomen following the administration of Isovue  300 per the CT PE protocol. Coronal and oblique 3D MIP images were  reconstructed from the original axial data set.    FINDINGS: There are no filling defects within the pulmonary arteries to  suggest an embolus. The thoracic aorta is normal in caliber with no  evidence of dissection. The heart is normal in size. There is coronary  artery disease. There are no pleural or pericardial effusions. There is  no adenopathy.  Lung windows reveal no focal opacities or suspicious  pulmonary nodules. There are hypoventilatory changes. There is bibasilar  atelectasis. The visualized upper abdomen shows bilateral renal cysts.  Bone windows reveal no acute osseous abnormalities.    Impression  No evidence of pulmonary embolus or dissection.          DLP:  CTDI:      This study was performed with techniques to keep radiation doses as low  as  reasonably achievable (ALARA). Individualized dose reduction  techniques using automated exposure control or adjustment of mA and/or  kV according to the patient size were employed.          Images were reviewed, interpreted, and dictated by CHRISTINA Zimmerman  Transcribed by Romina Holliday PA-C.    This report was signed and finalized on 7/28/2023 2:44 PM by CHRISTINA Coreas.      I have also reviewed his ER note, from July 2023, that mentions chest pain and dyspnea.     ===========================  ===========================    PFTs were reviewed.  Somewhat poorly performed.  Appeared to show moderate restriction    FeNO level was 24 today.    Laboratory workup also showed   Lab Results   Component Value Date    HGB 14.5 07/28/2023   ,   Lab Results   Component Value Date    HCT 43.2 07/28/2023       Lab Results   Component Value Date    EOSABS 0.20 07/28/2023    & Laboratory workup also showed   Lab Results   Component Value Date    CO2 23.1 07/28/2023     ===========================  ===========================    I told the patient that based on his presentation and PFTs, despite being limited and the fact that they were not appropriately performed, it is likely that the patient has some degree of asthma that could explain his symptoms.    Although the patient's PFTs do suggest moderate restriction, it could be from morbid obesity especially since the diffusion capacity was well-preserved.    The most recent CT did not appear to show any significant interstitial disease, that rules out significant interstitial lung disease as possible etiology but if he continues to have symptoms, then I would consider high-resolution CT scan    I will try to obtain patient's last sleep study from his [employer] company, [employer].     I told the patient that since his CPAP device is clearly more than 5 years old, he may need to be considered for a new CPAP device.    In the meanwhile, I have asked the patient to call  us if he needs new supplies as I would be more than happy to order them through the DME company    Dictated utilizing Dragon dictation.    This document was electronically signed by Polly Baugh MD on 11/21/23 at 10:53 EST

## 2024-01-22 ENCOUNTER — OFFICE VISIT (OUTPATIENT)
Dept: PULMONOLOGY | Facility: CLINIC | Age: 64
End: 2024-01-22
Payer: COMMERCIAL

## 2024-01-22 VITALS
HEART RATE: 91 BPM | HEIGHT: 70 IN | WEIGHT: 291 LBS | OXYGEN SATURATION: 95 % | SYSTOLIC BLOOD PRESSURE: 150 MMHG | BODY MASS INDEX: 41.66 KG/M2 | DIASTOLIC BLOOD PRESSURE: 88 MMHG

## 2024-01-22 DIAGNOSIS — G47.33 OSA ON CPAP: ICD-10-CM

## 2024-01-22 DIAGNOSIS — J45.40 MODERATE PERSISTENT ASTHMA WITHOUT COMPLICATION: Primary | ICD-10-CM

## 2024-01-22 DIAGNOSIS — J98.4 RESTRICTIVE LUNG DISEASE: ICD-10-CM

## 2024-01-22 RX ORDER — FLUTICASONE PROPIONATE AND SALMETEROL 250; 50 UG/1; UG/1
1 POWDER RESPIRATORY (INHALATION)
Qty: 60 EACH | Refills: 5 | Status: SHIPPED | OUTPATIENT
Start: 2024-01-22

## 2024-01-22 RX ORDER — ALBUTEROL SULFATE 90 UG/1
2 AEROSOL, METERED RESPIRATORY (INHALATION) EVERY 4 HOURS PRN
Qty: 6.7 G | Refills: 5 | Status: SHIPPED | OUTPATIENT
Start: 2024-01-22

## 2024-01-22 NOTE — PROGRESS NOTES
"     Follow Up Office Visit      Patient Name: Matthew Gibbs    Chief Complaint:    Chief Complaint   Patient presents with    Sleeping Problem     JI on CPAP       History of Present Illness: Matthew Gibbs is a 63 y.o. male who is here today for follow up of shortness of breath.  Since last visit, he notes that his breathing has been \"okay\".  He notes that Symbicort is expensive.  Does not currently have an albuterol inhaler.  He notes that his chronic back pain limits his functional capacity more than his breathing does.  + JI on CPAP nightly, doing well.  Uses a nasal mask.  His current CPAP machine is >5 years old.    Duration: Dyspnea since having COVID in June 2023  Associated Symptoms: Exertional dyspnea, occasional productive cough in the mornings, no wheezing, no fevers, no hemoptysis, no unintended weight loss  Supplemental Oxygen: No    Subjective      Review of Systems:  Review of Systems   Constitutional:  Negative for fever and unexpected weight change.   Respiratory:  Positive for cough and shortness of breath. Negative for wheezing.    Cardiovascular:  Negative for chest pain and leg swelling.        Past Medical History:   Past Medical History:   Diagnosis Date    Depression     Diabetes     Hyperlipidemia     Hypertension     Kidney stones     Thyroid disease        Past Surgical History:   Past Surgical History:   Procedure Laterality Date    BACK SURGERY  01/29/2013       Family History: History reviewed. No pertinent family history.    Social History:   Social History     Socioeconomic History    Marital status:    Tobacco Use    Smoking status: Never     Passive exposure: Never    Smokeless tobacco: Current     Types: Chew   Vaping Use    Vaping Use: Never used   Substance and Sexual Activity    Alcohol use: Yes     Comment: occasional    Drug use: Never    Sexual activity: Defer       Current Medications:     Current Outpatient Medications:     atorvastatin (LIPITOR) 40 MG tablet, Take " "1 tablet by mouth Daily., Disp: , Rfl:     citalopram (CeleXA) 40 MG tablet, Take 1 tablet by mouth Daily., Disp: , Rfl:     glipizide (GLUCOTROL) 10 MG tablet, , Disp: , Rfl:     levothyroxine (SYNTHROID, LEVOTHROID) 88 MCG tablet, Take 1 tablet by mouth Daily., Disp: , Rfl:     losartan (COZAAR) 25 MG tablet, Take 1 tablet by mouth Daily. for blood pressure., Disp: , Rfl:     metFORMIN ER (GLUCOPHAGE-XR) 500 MG 24 hr tablet, TAKE 1 TABLET BY MOUTH EVERY DAY FOR SUGAR, Disp: , Rfl:     oxyCODONE-acetaminophen (PERCOCET) 7.5-325 MG per tablet, Take 1 tablet by mouth Every 8 (Eight) Hours., Disp: , Rfl:     baclofen (LIORESAL) 20 MG tablet, Take 1 tablet by mouth 2 (Two) Times a Day As Needed. (Patient not taking: Reported on 1/22/2024), Disp: , Rfl:    Symbicort 160 mcg    Allergies:   Allergies   Allergen Reactions    Gabapentin Mental Status Change     Anger issues and suicidal thoughts        Objective     Physical Exam:  Vital Signs:   Vitals:    01/22/24 1431   BP: 150/88   Pulse: 91   SpO2: 95%   Weight: 132 kg (291 lb)   Height: 177.8 cm (70\")     Body mass index is 41.75 kg/m².    Physical Exam  Vitals reviewed.   Constitutional:       General: He is not in acute distress.     Appearance: He is obese. He is not toxic-appearing.   HENT:      Head: Normocephalic and atraumatic.      Mouth/Throat:      Mouth: Mucous membranes are moist.   Eyes:      Conjunctiva/sclera: Conjunctivae normal.   Cardiovascular:      Rate and Rhythm: Normal rate.      Heart sounds: Normal heart sounds.   Pulmonary:      Effort: Pulmonary effort is normal.      Breath sounds: Normal breath sounds.   Abdominal:      General: There is no distension.      Palpations: Abdomen is soft.   Musculoskeletal:         General: No swelling.      Cervical back: Neck supple.   Skin:     General: Skin is warm and dry.      Findings: No rash.   Neurological:      General: No focal deficit present.      Mental Status: He is alert and oriented to " person, place, and time.   Psychiatric:         Mood and Affect: Mood normal.         Behavior: Behavior normal.       July 2023 CTA chest showed no evidence of pulmonary embolus or dissection.     Assessment / Plan      Assessment/Plan:   Diagnoses and all orders for this visit:    1. Moderate persistent asthma without complication (Primary)  -     Fluticasone-Salmeterol (ADVAIR/WIXELA) 250-50 MCG/ACT DISKUS; Inhale 1 puff 2 (Two) Times a Day. Rinse mouth out after use  Dispense: 60 each; Refill: 5  -     albuterol sulfate  (90 Base) MCG/ACT inhaler; Inhale 2 puffs Every 4 (Four) Hours As Needed for Wheezing.  Dispense: 6.7 g; Refill: 5  Stop Symbicort and transition to Advair due to Symbicort cost to determine if Advair is more affordable. We discussed the risk and benefits of inhaled corticosteroids. Patient instructed to take them on a regular basis as prescribed. Patient instructed to rinse their mouth out after each use. Patient instructed to contact their insurance company and make sure that the medications prescribed are on their formulary and the lowest cost/tier for them. They will call the clinic back if different medications need to prescribed.     2. JI on CPAP  -     BIPAP / CPAP Adjustment; Future  Continue on CPAP nightly.  Order written for new CPAP machine due to age of device being greater than 5 years old.    3. Restrictive lung disease  Suggested on recent PFTs.  Likely weight related as his most recent CT chest did not reveal any evidence of interstitial lung disease.  Patient will benefit from weight loss. Patient was encouraged to maintain as much physical activity as she can safely tolerate.    Follow Up:   May 2024  The patient was counseled on diagnostic results, risks and benefits of treatment options, risk factor modifications and the importance of treatment compliance. The patient was advised to contact the clinic with concerns or worsening symptoms.     ELICIA Carbajal    Pulmonary Medicine [place]

## 2024-05-20 ENCOUNTER — OFFICE VISIT (OUTPATIENT)
Dept: PULMONOLOGY | Facility: CLINIC | Age: 64
End: 2024-05-20
Payer: COMMERCIAL

## 2024-05-20 VITALS
BODY MASS INDEX: 42.06 KG/M2 | OXYGEN SATURATION: 97 % | DIASTOLIC BLOOD PRESSURE: 86 MMHG | HEIGHT: 70 IN | WEIGHT: 293.8 LBS | SYSTOLIC BLOOD PRESSURE: 142 MMHG | RESPIRATION RATE: 18 BRPM | HEART RATE: 67 BPM

## 2024-05-20 DIAGNOSIS — J45.40 MODERATE PERSISTENT ASTHMA WITHOUT COMPLICATION: ICD-10-CM

## 2024-05-20 DIAGNOSIS — G47.33 OSA ON CPAP: Primary | ICD-10-CM

## 2024-05-20 DIAGNOSIS — E66.01 MORBID OBESITY, UNSPECIFIED OBESITY TYPE: ICD-10-CM

## 2024-05-20 PROCEDURE — 99214 OFFICE O/P EST MOD 30 MIN: CPT | Performed by: INTERNAL MEDICINE

## 2024-05-20 RX ORDER — FLUTICASONE PROPIONATE AND SALMETEROL 250; 50 UG/1; UG/1
1 POWDER RESPIRATORY (INHALATION)
Qty: 60 EACH | Refills: 5 | Status: SHIPPED | OUTPATIENT
Start: 2024-05-20

## 2024-05-20 RX ORDER — ALBUTEROL SULFATE 90 UG/1
2 AEROSOL, METERED RESPIRATORY (INHALATION) EVERY 4 HOURS PRN
Qty: 18 G | Refills: 5 | Status: SHIPPED | OUTPATIENT
Start: 2024-05-20

## 2024-05-20 NOTE — PROGRESS NOTES
"  Chief Complaint   Patient presents with    Shortness of Breath    Follow-up       Subjective   Matthew Gibbs is a 63 y.o. male.   Patient was evaluated today for follow up of asthma, and JI.     Patient does not report any recent exacerbations requiring emergency room visits or hospitalizations.     Patient is not entirely compliant with pulmonary medicines, although is not using Advair regularly.     he is using the rescue inhalers minimally.     Patient doesn't report any issues with the PAP device. The patient describes no significant issues with his mask either.     Patient says that the compliance with the use of the equipment is good.     Patient says that his symptoms of fatigue & daytime sleepiness have been helped greatly with the use of PAP, as prescribed.       The following portions of the patient's history were reviewed and updated as appropriate: allergies, current medications, past family history, past medical history, past social history, and past surgical history.    Review of Systems   HENT:  Negative for sinus pressure, sneezing and sore throat.    Respiratory:  Positive for chest tightness and shortness of breath. Negative for cough and wheezing.    Psychiatric/Behavioral:  Positive for sleep disturbance.        Objective   Visit Vitals  /86   Pulse 67   Resp 18   Ht 177.8 cm (70\") Comment: pt reported   Wt 133 kg (293 lb 12.8 oz)   SpO2 97%   BMI 42.16 kg/m²         BMI Readings from Last 3 Encounters:   05/20/24 42.16 kg/m²   01/22/24 41.75 kg/m²   11/21/23 41.75 kg/m²       Physical Exam  Vitals reviewed.   Constitutional:       Appearance: He is well-developed.   HENT:      Head: Normocephalic and atraumatic.      Mouth/Throat:      Comments: Oropharynx was crowded.  Eyes:      Extraocular Movements: Extraocular movements intact.   Cardiovascular:      Rate and Rhythm: Normal rate.   Pulmonary:      Comments: Somewhat hyperresonant to percussion.  Somewhat decreased air entry.  No " obvious wheezing noted.   Musculoskeletal:      Cervical back: Neck supple.   Neurological:      Mental Status: He is alert and oriented to person, place, and time.           Assessment & Plan   Diagnoses and all orders for this visit:    1. JI on CPAP (Primary)  -     BIPAP / CPAP Adjustment  -     BIPAP / CPAP Adjustment    2. Moderate persistent asthma without complication  -     Fluticasone-Salmeterol (ADVAIR/WIXELA) 250-50 MCG/ACT DISKUS; Inhale 1 puff 2 (Two) Times a Day. Rinse mouth out after use  Dispense: 60 each; Refill: 5  -     albuterol sulfate  (90 Base) MCG/ACT inhaler; Inhale 2 puffs Every 4 (Four) Hours As Needed for Wheezing.  Dispense: 18 g; Refill: 5    3. Morbid obesity, unspecified obesity type           Return in about 4 months (around 9/20/2024) for Recheck, For Alexa Velasquez), ...Also 12 mths w/Antonella.    DISCUSSION (if any):  It appears that his symptoms of asthma are under good control with the current regimen.    It seems that Covid triggered his asthma?    Patient's medications for underlying asthma were reviewed in great detail.    Compliance with medications stressed.     Side effects of prescribed medications discussed with the patient.    The need to continue to be aware of triggers that may cause asthma exacerbation versus progression of disease, was also discussed.    I have discussed asthma action plan with him.    The patient was asked to call this office if the symptoms worsen.    Sleep study performed in 2015?  Will try to get his sleep study from DME company     Latest PAP device provided in 2017?  [employer] company: [employer]    Current PAP settings: 18  Current mask type: nasal mask    Compliance data will be obtained from the his device/DME company.    Continue PAP device on a regular basis, at least 4 hours or more per night.    Sleep hygiene measures were discussed    Weight loss advised.        Dictated utilizing Dragon dictation.    This document was  electronically signed by Polly Baugh MD on 05/20/24 at 11:58 EDT

## 2024-09-20 ENCOUNTER — OFFICE VISIT (OUTPATIENT)
Dept: PULMONOLOGY | Facility: CLINIC | Age: 64
End: 2024-09-20
Payer: MEDICARE

## 2024-09-20 VITALS
DIASTOLIC BLOOD PRESSURE: 84 MMHG | SYSTOLIC BLOOD PRESSURE: 134 MMHG | HEIGHT: 70 IN | HEART RATE: 83 BPM | BODY MASS INDEX: 42.09 KG/M2 | OXYGEN SATURATION: 95 % | WEIGHT: 294 LBS

## 2024-09-20 DIAGNOSIS — G47.33 OSA ON CPAP: Primary | ICD-10-CM

## 2024-09-20 DIAGNOSIS — J45.20 MILD INTERMITTENT ASTHMA WITHOUT COMPLICATION: ICD-10-CM

## 2024-09-20 DIAGNOSIS — J98.4 RESTRICTIVE LUNG DISEASE: ICD-10-CM

## 2024-09-20 DIAGNOSIS — J30.9 ALLERGIC RHINITIS, UNSPECIFIED SEASONALITY, UNSPECIFIED TRIGGER: ICD-10-CM

## 2024-09-20 RX ORDER — FLUTICASONE PROPIONATE 50 MCG
SPRAY, SUSPENSION (ML) NASAL
COMMUNITY
Start: 2024-08-19

## 2024-09-20 RX ORDER — INSULIN GLARGINE 100 [IU]/ML
INJECTION, SOLUTION SUBCUTANEOUS
COMMUNITY
Start: 2024-07-19

## 2024-09-20 RX ORDER — LORATADINE 10 MG/1
CAPSULE, LIQUID FILLED ORAL
COMMUNITY

## 2024-10-15 ENCOUNTER — TELEPHONE (OUTPATIENT)
Dept: PULMONOLOGY | Facility: CLINIC | Age: 64
End: 2024-10-15
Payer: MEDICARE

## 2024-10-15 DIAGNOSIS — G47.33 OBSTRUCTIVE SLEEP APNEA: Primary | ICD-10-CM

## 2024-10-15 NOTE — TELEPHONE ENCOUNTER
Spoke with patient and he is agreeable to a home sleep study since he is trying to get a new machine and per Maura ([employer]) he needs a study since neither me or her can find a record of it. Home sleep study order placed by Alexa.

## 2024-10-29 ENCOUNTER — HOSPITAL ENCOUNTER (OUTPATIENT)
Dept: SLEEP MEDICINE | Facility: HOSPITAL | Age: 64
Discharge: HOME | End: 2024-10-29
Admitting: NURSE PRACTITIONER
Payer: MEDICARE

## 2024-10-29 DIAGNOSIS — G47.33 OBSTRUCTIVE SLEEP APNEA: ICD-10-CM

## 2024-10-29 PROCEDURE — G0399 HOME SLEEP TEST/TYPE 3 PORTA: HCPCS

## 2024-11-01 ENCOUNTER — TELEPHONE (OUTPATIENT)
Dept: PULMONOLOGY | Facility: CLINIC | Age: 64
End: 2024-11-01

## 2024-11-01 DIAGNOSIS — G47.33 OBSTRUCTIVE SLEEP APNEA: Primary | ICD-10-CM

## 2024-11-01 NOTE — TELEPHONE ENCOUNTER
Caller: Matthew Gibbs    Relationship: Self    Best call back number: [phone]     Caller requesting test results: SLEEP STUDY    What test was performed: SLEEP STUDY       Additional notes: PATIENT STATES HE MISSED A CALL FROM SOMEONE TO REVIEW SLEEP STUDY RESULTS. HE WOULD LIKE A CALL BACK TO DISCUSS.

## 2025-04-08 ENCOUNTER — TRANSCRIBE ORDERS (OUTPATIENT)
Dept: ADMINISTRATIVE | Facility: HOSPITAL | Age: 65
End: 2025-04-08
Payer: MEDICARE

## 2025-04-08 DIAGNOSIS — M47.816 LUMBAR SPONDYLOSIS: Primary | ICD-10-CM

## 2025-05-13 ENCOUNTER — HOSPITAL ENCOUNTER (OUTPATIENT)
Dept: MRI IMAGING | Facility: HOSPITAL | Age: 65
Discharge: HOME | End: 2025-05-13
Admitting: ANESTHESIOLOGY
Payer: MEDICARE

## 2025-05-13 DIAGNOSIS — M47.816 LUMBAR SPONDYLOSIS: ICD-10-CM

## 2025-05-13 PROCEDURE — 72148 MRI LUMBAR SPINE W/O DYE: CPT

## 2025-05-20 ENCOUNTER — OFFICE VISIT (OUTPATIENT)
Dept: PULMONOLOGY | Facility: CLINIC | Age: 65
End: 2025-05-20
Payer: MEDICARE

## 2025-05-20 VITALS
HEART RATE: 85 BPM | DIASTOLIC BLOOD PRESSURE: 82 MMHG | BODY MASS INDEX: 36.36 KG/M2 | OXYGEN SATURATION: 96 % | HEIGHT: 70 IN | SYSTOLIC BLOOD PRESSURE: 136 MMHG | WEIGHT: 254 LBS | RESPIRATION RATE: 18 BRPM

## 2025-05-20 DIAGNOSIS — J45.40 MODERATE PERSISTENT ASTHMA WITHOUT COMPLICATION: ICD-10-CM

## 2025-05-20 DIAGNOSIS — E66.9 OBESITY (BMI 30-39.9): ICD-10-CM

## 2025-05-20 DIAGNOSIS — J30.9 ALLERGIC RHINITIS, UNSPECIFIED SEASONALITY, UNSPECIFIED TRIGGER: ICD-10-CM

## 2025-05-20 DIAGNOSIS — Z86.16 HISTORY OF COVID-19: ICD-10-CM

## 2025-05-20 DIAGNOSIS — G47.33 OBSTRUCTIVE SLEEP APNEA: Primary | ICD-10-CM

## 2025-05-20 PROCEDURE — 99214 OFFICE O/P EST MOD 30 MIN: CPT | Performed by: NURSE PRACTITIONER

## 2025-05-20 PROCEDURE — 3079F DIAST BP 80-89 MM HG: CPT | Performed by: NURSE PRACTITIONER

## 2025-05-20 PROCEDURE — 3075F SYST BP GE 130 - 139MM HG: CPT | Performed by: NURSE PRACTITIONER

## 2025-05-20 RX ORDER — ALBUTEROL SULFATE 90 UG/1
2 INHALANT RESPIRATORY (INHALATION) EVERY 4 HOURS PRN
Qty: 18 G | Refills: 5 | Status: SHIPPED | OUTPATIENT
Start: 2025-05-20

## 2025-05-20 RX ORDER — FLUTICASONE PROPIONATE 50 MCG
1 SPRAY, SUSPENSION (ML) NASAL DAILY
Qty: 16 G | Refills: 6 | Status: SHIPPED | OUTPATIENT
Start: 2025-05-20

## 2025-05-20 RX ORDER — TIRZEPATIDE 10 MG/.5ML
INJECTION, SOLUTION SUBCUTANEOUS
COMMUNITY
Start: 2025-04-21

## 2025-05-20 NOTE — PROGRESS NOTES
"Chief Complaint   Patient presents with    Sleeping Problem    Follow-up         Subjective   Matthew Gibbs is a 64 y.o. male.   Patient is here today for follow up of asthma and shortness of breath, and JI.     Patient says that since the last office visit he has had no recent exacerbations. he denies any emergency room visits or hospitalizations, due to his asthma.     He had the flu about 6 weeks ago. He uses TIFFANIE then.     The patient says that he is compliant with pulmonary medicines, as prescribed. He only uses TIFFANIE when he has a flare up.     He uses nasal spray only as needed.      The following portions of the patient's history were reviewed and updated as appropriate: allergies, current medications, past family history, past medical history, past social history, and past surgical history.    Review of Systems   HENT:  Negative for sinus pressure, sneezing and sore throat.    Respiratory:  Negative for cough, chest tightness, shortness of breath and wheezing.    Psychiatric/Behavioral:  Negative for sleep disturbance.        Objective   Visit Vitals  /82   Pulse 85   Resp 18   Ht 177.8 cm (70\")   Wt 115 kg (254 lb)   SpO2 96%   BMI 36.45 kg/m²       Physical Exam  Vitals reviewed.   HENT:      Head: Atraumatic.      Mouth/Throat:      Mouth: Mucous membranes are moist.      Comments: Crowded oropharynx.   Eyes:      Extraocular Movements: Extraocular movements intact.   Cardiovascular:      Rate and Rhythm: Normal rate and regular rhythm.   Pulmonary:      Effort: Pulmonary effort is normal. No respiratory distress.      Breath sounds: Normal breath sounds. No wheezing.   Abdominal:      Comments: Obese abdomen.    Skin:     General: Skin is warm.   Neurological:      Mental Status: He is alert and oriented to person, place, and time.           Assessment & Plan   Diagnoses and all orders for this visit:    1. Obstructive sleep apnea (Primary)    2. Moderate persistent asthma without complication  -   "   albuterol sulfate  (90 Base) MCG/ACT inhaler; Inhale 2 puffs Every 4 (Four) Hours As Needed for Wheezing.  Dispense: 18 g; Refill: 5    3. Allergic rhinitis, unspecified seasonality, unspecified trigger    4. Obesity (BMI 30-39.9)    5. History of COVID-19    Other orders  -     fluticasone (FLONASE) 50 MCG/ACT nasal spray; Administer 1 spray into the nostril(s) as directed by provider Daily.  Dispense: 16 g; Refill: 6           Return in about 8 months (around 1/20/2026) for Recheck, For Dr. Baugh.    DISCUSSION (if any):  PFT 11/2023 consistent with no obstruction.     His symptoms of asthma are under adequate control at this time. Continue TIFFANIE as needed.     Patient's medications for underlying asthma were reviewed with him in great detail.    Any needed adjustments to his pulmonary medications, either for clinical or insurance coverage reasons, have been made and are reflected in the orders.    Side effects of prescribed medications discussed with the patient.    Asthma action plan with discussed with him.    The patient was asked to call this office if the symptoms worsen.     Sleep study performed in 2015?  Will try to get his sleep study from [facility]     Home sleep study 10/2024  AHI 68/hour     Latest PAP device provided in 2017?  [employer] company: [employer]     Current PAP settings: 18  Current mask type: nasal mask    Continue treatment with CPAP at a pressure of 18, with a nasal mask.    Patient's compliance data was reviewed and the compliance is 100%.    Humidification setup, hose and mask care discussed.    Weight loss advised. He has lost about 50 lbs since being on Mounjaro.     Use every night for at least 4 hours stressed.       Dictated utilizing Dragon dictation.    This document was electronically signed by ELICIA Damon May 20, 2025  10:45 EDT